# Patient Record
Sex: FEMALE | NOT HISPANIC OR LATINO | URBAN - METROPOLITAN AREA
[De-identification: names, ages, dates, MRNs, and addresses within clinical notes are randomized per-mention and may not be internally consistent; named-entity substitution may affect disease eponyms.]

---

## 2017-05-17 ENCOUNTER — IMPORTED ENCOUNTER (OUTPATIENT)
Dept: URBAN - METROPOLITAN AREA CLINIC 50 | Facility: CLINIC | Age: 72
End: 2017-05-17

## 2017-05-25 ENCOUNTER — IMPORTED ENCOUNTER (OUTPATIENT)
Dept: URBAN - METROPOLITAN AREA CLINIC 50 | Facility: CLINIC | Age: 72
End: 2017-05-25

## 2017-06-02 ENCOUNTER — IMPORTED ENCOUNTER (OUTPATIENT)
Dept: URBAN - METROPOLITAN AREA CLINIC 50 | Facility: CLINIC | Age: 72
End: 2017-06-02

## 2017-06-08 ENCOUNTER — IMPORTED ENCOUNTER (OUTPATIENT)
Dept: URBAN - METROPOLITAN AREA CLINIC 50 | Facility: CLINIC | Age: 72
End: 2017-06-08

## 2017-06-16 ENCOUNTER — IMPORTED ENCOUNTER (OUTPATIENT)
Dept: URBAN - METROPOLITAN AREA CLINIC 50 | Facility: CLINIC | Age: 72
End: 2017-06-16

## 2017-06-22 ENCOUNTER — IMPORTED ENCOUNTER (OUTPATIENT)
Dept: URBAN - METROPOLITAN AREA CLINIC 50 | Facility: CLINIC | Age: 72
End: 2017-06-22

## 2017-06-30 ENCOUNTER — IMPORTED ENCOUNTER (OUTPATIENT)
Dept: URBAN - METROPOLITAN AREA CLINIC 50 | Facility: CLINIC | Age: 72
End: 2017-06-30

## 2017-07-21 ENCOUNTER — IMPORTED ENCOUNTER (OUTPATIENT)
Dept: URBAN - METROPOLITAN AREA CLINIC 50 | Facility: CLINIC | Age: 72
End: 2017-07-21

## 2017-11-03 ENCOUNTER — IMPORTED ENCOUNTER (OUTPATIENT)
Dept: URBAN - METROPOLITAN AREA CLINIC 50 | Facility: CLINIC | Age: 72
End: 2017-11-03

## 2018-02-09 ENCOUNTER — IMPORTED ENCOUNTER (OUTPATIENT)
Dept: URBAN - METROPOLITAN AREA CLINIC 50 | Facility: CLINIC | Age: 73
End: 2018-02-09

## 2018-04-20 ENCOUNTER — IMPORTED ENCOUNTER (OUTPATIENT)
Dept: URBAN - METROPOLITAN AREA CLINIC 50 | Facility: CLINIC | Age: 73
End: 2018-04-20

## 2019-05-01 ENCOUNTER — IMPORTED ENCOUNTER (OUTPATIENT)
Dept: URBAN - METROPOLITAN AREA CLINIC 50 | Facility: CLINIC | Age: 74
End: 2019-05-01

## 2020-06-04 ENCOUNTER — IMPORTED ENCOUNTER (OUTPATIENT)
Dept: URBAN - METROPOLITAN AREA CLINIC 50 | Facility: CLINIC | Age: 75
End: 2020-06-04

## 2021-04-17 ASSESSMENT — VISUAL ACUITY
OD_OTHER: 20/50. 20/100.
OS_SC: 20/30
OD_SC: 20/25
OD_CC: J2
OS_CC: 20/30-
OD_OTHER: 20/40. 20/60.
OS_OTHER: 20/40. 20/40.
OD_CC: J1
OS_OTHER: 20/40.
OD_SC: 20/25+2
OD_BAT: 20/40
OS_CC: 20/30+2
OD_SC: 20/30
OS_PH: @ 22 IN
OD_SC: 20/40-
OD_CC: 20/30-
OD_OTHER: 20/30. 20/40.
OD_SC: 20/30+
OS_CC: J2
OD_SC: 20/25
OD_BAT: 20/50
OS_SC: 20/30
OS_CC: J1+
OD_CC: J2NEAR
OS_CC: 20/25
OD_BAT: 20/30
OD_OTHER: 20/40. 20/400.
OS_BAT: 20/40
OS_CC: J1
OS_CC: J1-
OD_SC: 20/25-
OS_SC: 20/30-
OD_CC: J1-
OD_SC: 20/50-
OS_PH: 20/25
OD_CC: J1+
OD_PH: @ 23 IN
OS_SC: 20/50
OS_PH: 20/25
OS_OTHER: 20/40. 20/80.
OS_SC: 20/40-
OS_CC: J1+@ 16 IN
OS_OTHER: 20/50. 20/60.
OS_BAT: 20/40
OS_SC: 20/40-
OS_SC: 20/30-
OS_CC: J2NEAR
OD_BAT: 20/40
OD_PH: @ 18 IN
OS_OTHER: 20/50. 20/400.
OS_BAT: 20/50
OD_SC: 20/25
OD_CC: J1+@ 16 IN
OS_BAT: 20/40
OS_SC: 20/30+1
OS_BAT: 20/50

## 2021-04-17 ASSESSMENT — TONOMETRY
OD_IOP_MMHG: 16
OD_IOP_MMHG: 28
OS_IOP_MMHG: 16
OS_IOP_MMHG: 13
OD_IOP_MMHG: 15
OD_IOP_MMHG: 12
OS_IOP_MMHG: 11
OD_IOP_MMHG: 11
OS_IOP_MMHG: 12
OD_IOP_MMHG: 14
OS_IOP_MMHG: 15
OS_IOP_MMHG: 14
OS_IOP_MMHG: 18
OD_IOP_MMHG: 14
OD_IOP_MMHG: 14
OD_IOP_MMHG: 11
OS_IOP_MMHG: 11
OS_IOP_MMHG: 15
OS_IOP_MMHG: 15
OD_IOP_MMHG: 11

## 2021-06-15 ENCOUNTER — PREPPED CHART (OUTPATIENT)
Dept: URBAN - METROPOLITAN AREA CLINIC 53 | Facility: CLINIC | Age: 76
End: 2021-06-15

## 2021-06-18 ENCOUNTER — COMPREHENSIVE EXAM (OUTPATIENT)
Dept: URBAN - METROPOLITAN AREA CLINIC 53 | Facility: CLINIC | Age: 76
End: 2021-06-18

## 2021-06-18 DIAGNOSIS — H35.373: ICD-10-CM

## 2021-06-18 DIAGNOSIS — H16.223: ICD-10-CM

## 2021-06-18 PROCEDURE — 92014 COMPRE OPH EXAM EST PT 1/>: CPT

## 2021-06-18 PROCEDURE — 92134 CPTRZ OPH DX IMG PST SGM RTA: CPT

## 2021-06-18 ASSESSMENT — VISUAL ACUITY
OS_PH: 20/25-1
OS_SC: 20/60-1
OD_SC: 20/25
OU_SC: J1

## 2021-06-18 ASSESSMENT — TONOMETRY
OS_IOP_MMHG: 14
OD_IOP_MMHG: 14

## 2022-06-23 ENCOUNTER — COMPREHENSIVE EXAM (OUTPATIENT)
Dept: URBAN - METROPOLITAN AREA CLINIC 53 | Facility: CLINIC | Age: 77
End: 2022-06-23

## 2022-06-23 DIAGNOSIS — H16.223: ICD-10-CM

## 2022-06-23 DIAGNOSIS — H43.813: ICD-10-CM

## 2022-06-23 DIAGNOSIS — H35.373: ICD-10-CM

## 2022-06-23 DIAGNOSIS — H35.072: ICD-10-CM

## 2022-06-23 PROCEDURE — 92134 CPTRZ OPH DX IMG PST SGM RTA: CPT

## 2022-06-23 PROCEDURE — 92012 INTRM OPH EXAM EST PATIENT: CPT

## 2022-06-23 ASSESSMENT — VISUAL ACUITY
OS_SC: 20/40+2
OD_SC: 20/25
OS_PH: 20/25-1

## 2022-06-23 ASSESSMENT — TONOMETRY
OS_IOP_MMHG: 14
OD_IOP_MMHG: 14

## 2022-06-23 NOTE — PATIENT DISCUSSION
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 Discussed taking a multivitamin daily, eating dark leafy greens/almonds, 30 Minutes of exercise 3x a week and stressed the importance of quitting smoking. 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 Advised regular use of amsler grid. Recommend observation.

## 2023-06-26 ENCOUNTER — COMPREHENSIVE EXAM (OUTPATIENT)
Dept: URBAN - METROPOLITAN AREA CLINIC 53 | Facility: CLINIC | Age: 78
End: 2023-06-26

## 2023-06-26 DIAGNOSIS — H35.072: ICD-10-CM

## 2023-06-26 DIAGNOSIS — H35.373: ICD-10-CM

## 2023-06-26 DIAGNOSIS — H16.223: ICD-10-CM

## 2023-06-26 DIAGNOSIS — H43.813: ICD-10-CM

## 2023-06-26 PROCEDURE — 92134 CPTRZ OPH DX IMG PST SGM RTA: CPT

## 2023-06-26 PROCEDURE — 92014 COMPRE OPH EXAM EST PT 1/>: CPT

## 2023-06-26 ASSESSMENT — TONOMETRY
OS_IOP_MMHG: 12
OD_IOP_MMHG: 14

## 2023-06-26 ASSESSMENT — VISUAL ACUITY
OS_SC: 20/25
OU_SC: 20/25
OD_SC: 20/25-1
OU_SC: J1+

## 2024-07-17 ENCOUNTER — COMPREHENSIVE EXAM (OUTPATIENT)
Dept: URBAN - METROPOLITAN AREA CLINIC 53 | Facility: CLINIC | Age: 79
End: 2024-07-17

## 2024-07-17 DIAGNOSIS — H43.813: ICD-10-CM

## 2024-07-17 DIAGNOSIS — H35.373: ICD-10-CM

## 2024-07-17 DIAGNOSIS — H35.072: ICD-10-CM

## 2024-07-17 PROCEDURE — 92134 CPTRZ OPH DX IMG PST SGM RTA: CPT

## 2024-07-17 PROCEDURE — 99214 OFFICE O/P EST MOD 30 MIN: CPT

## 2024-07-17 ASSESSMENT — VISUAL ACUITY
OS_SC: 20/30
OU_SC: 20/25
OU_SC: J1
OD_SC: 20/25

## 2024-07-17 ASSESSMENT — TONOMETRY
OS_IOP_MMHG: 12
OD_IOP_MMHG: 12

## 2025-07-23 ENCOUNTER — COMPREHENSIVE EXAM (OUTPATIENT)
Age: 80
End: 2025-07-23

## 2025-07-23 DIAGNOSIS — H43.813: ICD-10-CM

## 2025-07-23 DIAGNOSIS — H35.072: ICD-10-CM

## 2025-07-23 DIAGNOSIS — H16.223: ICD-10-CM

## 2025-07-23 DIAGNOSIS — H35.373: ICD-10-CM

## 2025-07-23 PROCEDURE — 92134 CPTRZ OPH DX IMG PST SGM RTA: CPT

## 2025-07-23 PROCEDURE — 99214 OFFICE O/P EST MOD 30 MIN: CPT
